# Patient Record
Sex: MALE | Race: WHITE | Employment: OTHER | URBAN - METROPOLITAN AREA
[De-identification: names, ages, dates, MRNs, and addresses within clinical notes are randomized per-mention and may not be internally consistent; named-entity substitution may affect disease eponyms.]

---

## 2017-05-10 ENCOUNTER — TELEPHONE (OUTPATIENT)
Dept: SURGERY | Facility: CLINIC | Age: 72
End: 2017-05-10

## 2017-05-10 NOTE — TELEPHONE ENCOUNTER
Pt has appt 7/7- states he is leaving the country on 7/3 until September - asking if there is sometime in June he can be seen

## 2017-05-14 ENCOUNTER — TELEPHONE (OUTPATIENT)
Dept: SURGERY | Facility: CLINIC | Age: 72
End: 2017-05-14

## 2017-05-14 DIAGNOSIS — R35.1 NOCTURIA: ICD-10-CM

## 2017-05-14 DIAGNOSIS — Z12.5 SPECIAL SCREENING FOR MALIGNANT NEOPLASM OF PROSTATE: Primary | ICD-10-CM

## 2017-05-15 NOTE — TELEPHONE ENCOUNTER
I tried to reach pt again and this time I spoke with him and informed of the appt I had to offer and I could tell that pt was not understanding what I was saying because he kept repeating the wrong date.  I told him that I would call right back with an inte

## 2017-05-15 NOTE — TELEPHONE ENCOUNTER
Nurses, please ask   to get patient in to see me either this month, or at the latest first week in June --please check last office visit note May or June 2016 and see what labs I requested and please make sure patient gets those labs compl

## 2017-05-15 NOTE — TELEPHONE ENCOUNTER
I attempted to reach pt at the only # listed and it just keeps ringing and no answ. Machine or VM goes on. I will try again. I am holding an appt slot for thurs 5/25 at 2:20pm for 20 min O/V.  I will make new orders for pt's psa screen and ua that he was rodriguez

## 2017-05-21 ENCOUNTER — TELEPHONE (OUTPATIENT)
Dept: SURGERY | Facility: CLINIC | Age: 72
End: 2017-05-21

## 2017-05-22 ENCOUNTER — APPOINTMENT (OUTPATIENT)
Dept: LAB | Facility: HOSPITAL | Age: 72
End: 2017-05-22
Attending: UROLOGY
Payer: MEDICARE

## 2017-05-22 DIAGNOSIS — R35.1 NOCTURIA: ICD-10-CM

## 2017-05-22 DIAGNOSIS — Z12.5 SPECIAL SCREENING FOR MALIGNANT NEOPLASM OF PROSTATE: ICD-10-CM

## 2017-05-22 PROCEDURE — 81001 URINALYSIS AUTO W/SCOPE: CPT

## 2017-05-22 PROCEDURE — 36415 COLL VENOUS BLD VENIPUNCTURE: CPT

## 2017-05-22 NOTE — TELEPHONE ENCOUNTER
See below. Phoned pt again and spoke with him. Informed him of 's request to bring all of his test results with him, as outlined below. He states he is picking up his tests today, and agrees to bring to his appt 5/25/17.

## 2017-05-22 NOTE — TELEPHONE ENCOUNTER
Phoned pt using language line, Ashley Bianchi , Id # [id number]. Provided her with pt's contact number. She attempted to reach pt, however phone rang for extended period of time without answer or voice mail option.

## 2017-05-22 NOTE — TELEPHONE ENCOUNTER
Please call patient, and if he does not understand [place], please use language line for [place]--  Patient has appointment to see me this week; reportedly he had some type of lab test or some type of x-ray test performed at another institution which sh

## 2017-05-25 ENCOUNTER — OFFICE VISIT (OUTPATIENT)
Dept: SURGERY | Facility: CLINIC | Age: 72
End: 2017-05-25

## 2017-05-25 VITALS — RESPIRATION RATE: 16 BRPM | TEMPERATURE: 98 F | DIASTOLIC BLOOD PRESSURE: 78 MMHG | SYSTOLIC BLOOD PRESSURE: 120 MMHG

## 2017-05-25 DIAGNOSIS — R10.9 FLANK PAIN: Primary | ICD-10-CM

## 2017-05-25 DIAGNOSIS — N40.1 BENIGN NON-NODULAR PROSTATIC HYPERPLASIA WITH LOWER URINARY TRACT SYMPTOMS: ICD-10-CM

## 2017-05-25 DIAGNOSIS — M47.817 SPONDYLOSIS OF LUMBOSACRAL REGION WITHOUT MYELOPATHY OR RADICULOPATHY: ICD-10-CM

## 2017-05-25 DIAGNOSIS — R10.31 RIGHT LOWER QUADRANT PAIN: ICD-10-CM

## 2017-05-25 DIAGNOSIS — Z12.5 PROSTATE CANCER SCREENING: ICD-10-CM

## 2017-05-25 DIAGNOSIS — R35.1 NOCTURIA: ICD-10-CM

## 2017-05-25 PROCEDURE — 99215 OFFICE O/P EST HI 40 MIN: CPT | Performed by: UROLOGY

## 2017-05-25 PROCEDURE — G0463 HOSPITAL OUTPT CLINIC VISIT: HCPCS | Performed by: UROLOGY

## 2017-05-25 NOTE — PATIENT INSTRUCTIONS
1.  With respect to your right lower quadrant discomfort radiating to the right flank, there is no evidence that it is due to a urinary tract disease process because the CT at [place] [address] on 3/29/17 shows the kidneys to be normal with no hydronephrosis.

## 2017-05-29 NOTE — PROGRESS NOTES
HPI:    Patient ID: Jennifer Dang is a 66year old male. Flank Pain   Pertinent negatives include no fever.          Voiding Dysfunction  Patient has current AUA score of 3, mild voiding dysfunction category, compared to previous score of 3.5, mild voiding of the upper urinary tracts, no hydronephrosis, no stones. 3/11/17 EGD [facility] showed mild esophagitis consistent with reflux; stomach and duodenum unremarkable; colonoscopy with biopsy unremarkable.     Review of Systems   Constitutional: Neg 0.7  5/6/14 PSA 0.4  3/23/17 [facility] labs est. GFR 79 normal;  creatinine 0.96 normal;  alkaline phosphatase 53 normal.         IMAGING:  3/29/17 [facility] CT abdomen and pelvis with contrast = kidneys unremarkable; bladder unremarkable; prominent showed no hydronephrosis, no urolithiasis; I explained to him that, hence, pain  not urological in nature.   Patient will follow up with his gastroenterologist and primary physician concerning that      (Z12.5) Prostate cancer screening  Plan: Patient is 66

## 2018-05-10 ENCOUNTER — APPOINTMENT (OUTPATIENT)
Dept: LAB | Facility: HOSPITAL | Age: 73
End: 2018-05-10
Attending: UROLOGY
Payer: MEDICARE

## 2018-05-10 DIAGNOSIS — R35.1 NOCTURIA: ICD-10-CM

## 2018-05-10 PROCEDURE — 81001 URINALYSIS AUTO W/SCOPE: CPT

## 2018-05-25 ENCOUNTER — OFFICE VISIT (OUTPATIENT)
Dept: SURGERY | Facility: CLINIC | Age: 73
End: 2018-05-25

## 2018-05-25 VITALS
HEIGHT: 68 IN | BODY MASS INDEX: 30.31 KG/M2 | HEART RATE: 68 BPM | DIASTOLIC BLOOD PRESSURE: 66 MMHG | SYSTOLIC BLOOD PRESSURE: 104 MMHG | WEIGHT: 200 LBS

## 2018-05-25 DIAGNOSIS — N40.1 BENIGN NON-NODULAR PROSTATIC HYPERPLASIA WITH LOWER URINARY TRACT SYMPTOMS: Primary | ICD-10-CM

## 2018-05-25 DIAGNOSIS — Z79.82 ASPIRIN LONG-TERM USE: ICD-10-CM

## 2018-05-25 DIAGNOSIS — R35.1 NOCTURIA: ICD-10-CM

## 2018-05-25 PROCEDURE — 99214 OFFICE O/P EST MOD 30 MIN: CPT | Performed by: UROLOGY

## 2018-05-25 PROCEDURE — G0463 HOSPITAL OUTPT CLINIC VISIT: HCPCS | Performed by: UROLOGY

## 2018-05-25 RX ORDER — OMEPRAZOLE 20 MG/1
CAPSULE, DELAYED RELEASE ORAL
COMMUNITY
Start: 2018-04-20

## 2018-05-25 RX ORDER — CLONAZEPAM 0.5 MG/1
TABLET ORAL
Refills: 2 | COMMUNITY
Start: 2018-03-29

## 2018-05-25 RX ORDER — ATORVASTATIN CALCIUM 20 MG/1
TABLET, FILM COATED ORAL
COMMUNITY
Start: 2018-03-29

## 2018-05-25 NOTE — PROGRESS NOTES
HPI:    Patient ID: Edd Jones is a 67year old male. HPI    History provided by pt and wife, [place] translation provided by me.      Voiding Dysfunction  Patient has current AUA score of 4, mild voiding dysfunction category, similar compared to pre hyperactive cremasteric reflex on the left side. 5/16/2014 the patient had borderline microhematuria 3 RBC/hpf.    Since 2014, intermittent right lower quadrant and right lower flank pain; 3/29/17 [facility] CT abdomen pelvis with contrast sh Hemorrhoids 2006    per [employer]:  hemorrhoidectomy   • Hydrocele 05/19/2014    per [facility]:     • Lateral epicondylitis  of elbow     per [facility]:  right elbow   • Osteoarthrosis, unspecified whether generalized or localized, unspecified site     per [facility] person, place, and time. He appears well-developed and well-nourished. No distress. HENT:   Head: Normocephalic and atraumatic. Eyes: EOM are normal.   Neck: Normal range of motion. Pulmonary/Chest: Effort normal. No respiratory distress.    Flower Betancourt discussed, explained, and answered questions on treatment options and patient understood and chooses to continue observation.  I explained to the pt that the heart healthy diet is the prostate healthy diet -- please see summary of discussion that took place than processed grain. A significant part of your diet should be  vegetables, nuts, and fruits. Olive oil is better than butter,  which is likely better than margarine. Fish oil supplement recommended.   An excessive amount of vitamin pills is not recommen

## 2018-05-25 NOTE — PATIENT INSTRUCTIONS
1.   Visit in one year. Urinalysis urien test before visit    2. The heart healthy diet is the prostate healthy diet. Eat healthy food choices from the [address] diet: avoid processed foods and avoid polyunsaturated fats.  Eating fish is  better th

## 2019-05-16 ENCOUNTER — APPOINTMENT (OUTPATIENT)
Dept: LAB | Facility: HOSPITAL | Age: 74
End: 2019-05-16
Attending: UROLOGY
Payer: MEDICARE

## 2019-05-16 DIAGNOSIS — R35.1 NOCTURIA: ICD-10-CM

## 2019-05-16 PROCEDURE — 81003 URINALYSIS AUTO W/O SCOPE: CPT

## 2019-05-24 ENCOUNTER — APPOINTMENT (OUTPATIENT)
Dept: LAB | Facility: HOSPITAL | Age: 74
End: 2019-05-24
Attending: UROLOGY
Payer: MEDICARE

## 2019-05-24 ENCOUNTER — OFFICE VISIT (OUTPATIENT)
Dept: SURGERY | Facility: CLINIC | Age: 74
End: 2019-05-24
Payer: MEDICARE

## 2019-05-24 VITALS
TEMPERATURE: 98 F | HEART RATE: 67 BPM | DIASTOLIC BLOOD PRESSURE: 75 MMHG | SYSTOLIC BLOOD PRESSURE: 129 MMHG | BODY MASS INDEX: 30 KG/M2 | WEIGHT: 200 LBS

## 2019-05-24 DIAGNOSIS — N40.1 BENIGN NON-NODULAR PROSTATIC HYPERPLASIA WITH LOWER URINARY TRACT SYMPTOMS: Primary | ICD-10-CM

## 2019-05-24 DIAGNOSIS — Z12.5 PROSTATE CANCER SCREENING: ICD-10-CM

## 2019-05-24 DIAGNOSIS — Z79.82 ASPIRIN LONG-TERM USE: ICD-10-CM

## 2019-05-24 DIAGNOSIS — R35.1 NOCTURIA: ICD-10-CM

## 2019-05-24 PROCEDURE — 99213 OFFICE O/P EST LOW 20 MIN: CPT | Performed by: UROLOGY

## 2019-05-24 PROCEDURE — G0463 HOSPITAL OUTPT CLINIC VISIT: HCPCS | Performed by: UROLOGY

## 2019-05-24 PROCEDURE — 81003 URINALYSIS AUTO W/O SCOPE: CPT

## 2019-05-24 PROCEDURE — 36415 COLL VENOUS BLD VENIPUNCTURE: CPT

## 2019-05-24 NOTE — PROGRESS NOTES
HPI:    Patient ID: Anel Baron is a 75year old male. HPI    History provided by patient in [place], translation provided by me.      Voiding Dysfunction  Patient has current AUA score of 2, mild voiding dysfunction category, improved compared to pre contrast showed no pathology of the upper urinary tracts, no hydronephrosis, no stones. 3/11/17 EGD [facility] showed mild esophagitis consistent with reflux; stomach and duodenum unremarkable; colonoscopy with biopsy unremarkable.   05/25/2017 o tendon near insertion, right shoulder   • Unspecified essential hypertension       Past Surgical History:   Procedure Laterality Date   • ADENOIDECTOMY        Family History   Problem Relation Age of Onset   • Prostate Cancer Other    • Genito-Urinary Diso indurations   Musculoskeletal: Normal range of motion. Neurological: He is alert and oriented to person, place, and time. Skin: Skin is dry. Psychiatric: He has a normal mood and affect. Thought content normal.   Nursing note and vitals reviewed. dysfunction category. He is not currently taking any  medication. He admits that if he drank less before bed his nocturia would likely decrease. I recommend patient decrease his fluid intake 3 hours before bedtime if his nocturia bothers him.  Patient fee processed foods and avoid polyunsaturated fats. Eating fish is  better than poultry,  which is better than red meat. Beans are a very healthy option. Whole grain is better than processed grain.  A significant part of your diet should be  vegetables, nuts, a

## 2019-05-24 NOTE — PATIENT INSTRUCTIONS
Murrel Apgar, M.D.      1.  Blood draw for PSA--prostate cancer screening today; we will notify you of the results whether normal or abnormal.    2.   Visit in one year. Urinalysis urine test before visit    3.   If you wou

## 2020-05-28 ENCOUNTER — TELEPHONE (OUTPATIENT)
Dept: SURGERY | Facility: CLINIC | Age: 75
End: 2020-05-28

## 2020-05-28 NOTE — TELEPHONE ENCOUNTER
Do to COVID-19 (Coronavirus) global pandemic patient cancelled visit scheduled for tomorrow, states will call back to reschedule.

## 2020-12-01 ENCOUNTER — OFFICE VISIT (OUTPATIENT)
Dept: SURGERY | Facility: CLINIC | Age: 75
End: 2020-12-01
Payer: MEDICARE

## 2020-12-01 VITALS
DIASTOLIC BLOOD PRESSURE: 88 MMHG | SYSTOLIC BLOOD PRESSURE: 155 MMHG | RESPIRATION RATE: 16 BRPM | BODY MASS INDEX: 30.31 KG/M2 | WEIGHT: 200 LBS | HEART RATE: 66 BPM | HEIGHT: 68 IN

## 2020-12-01 DIAGNOSIS — N40.1 BENIGN PROSTATIC HYPERPLASIA WITH URINARY FREQUENCY: Primary | ICD-10-CM

## 2020-12-01 DIAGNOSIS — Z12.5 PROSTATE CANCER SCREENING: ICD-10-CM

## 2020-12-01 DIAGNOSIS — Z79.82 ASPIRIN LONG-TERM USE: ICD-10-CM

## 2020-12-01 DIAGNOSIS — R35.0 BENIGN PROSTATIC HYPERPLASIA WITH URINARY FREQUENCY: Primary | ICD-10-CM

## 2020-12-01 DIAGNOSIS — R10.9 RIGHT FLANK PAIN: ICD-10-CM

## 2020-12-01 DIAGNOSIS — R35.1 NOCTURIA: ICD-10-CM

## 2020-12-01 PROCEDURE — G0463 HOSPITAL OUTPT CLINIC VISIT: HCPCS | Performed by: UROLOGY

## 2020-12-01 PROCEDURE — 99214 OFFICE O/P EST MOD 30 MIN: CPT | Performed by: UROLOGY

## 2020-12-01 RX ORDER — MULTIVIT-MIN/IRON/FOLIC ACID/K 18-600-40
CAPSULE ORAL
COMMUNITY

## 2020-12-01 NOTE — PROGRESS NOTES
HPI:    Patient ID: Pricila Wright is a 67year old male. HPI     History provided by patient and wife. Translated in [place] by Dr. Alexandro Aden. BPH   Chronic. Patient is not currently taking any prostate medications for this.  He presently denies any assoc 10/23/12; prostate moderately enlarged without nodules; April 3, 2012 at [facility] he had had a PSA of 0.4. No evidence that he had prostate cancer. On physical exam he did have a hyperactive cremasteric reflex on the left side.  5/16/2014 the pat Allergies    HISTORY:  Past Medical History:   Diagnosis Date   • Hemorrhoids 2006    per [employer]:  hemorrhoidectomy   • Hydrocele 05/19/2014    per [employer]:     • Lateral epicondylitis  of elbow     per [employer]:  right elbow   • Osteoarthrosis, unspecifie kg/m².        LABORATORIES  07/17/2020 ([facility]) PSA = 0.4; Creatinine = 0.96; GFR = 77   05/24/2019 PSA = 0.56; UA blood = negative; Microscopic not indicated   05/16/2019 UA blood = negative; Microscopic not indicated  05/10/2018 UA RBC = 2; WBC = <1  05/2 current AUA score of 5, mild voiding dysfunction category; nocturia 3x. He is not currently taking any medication for this. We discussed starting tamsulosin 0.4 mg daily vs observation.  I fully explained to patient the benefits, risks, and alternatives to PSA before visit.            I explained to patient the risks, side effects, and alternatives, and I answered questions concerning them; patient understands all of this and decides to proceed with the following:      Treatment Plan & Patient Instructions

## 2020-12-01 NOTE — PATIENT INSTRUCTIONS
Tino Roberson M.D.    1.  Urinalysis urine test today--we will notify you of the results    2. KIDNEY/RENAL ULTRASOUND--please schedule by calling [phone].   Please request that it be performed either at [facility]

## 2020-12-09 ENCOUNTER — HOSPITAL ENCOUNTER (OUTPATIENT)
Dept: ULTRASOUND IMAGING | Facility: HOSPITAL | Age: 75
Discharge: HOME | End: 2020-12-09
Attending: UROLOGY
Payer: MEDICARE

## 2020-12-09 DIAGNOSIS — R10.9 RIGHT FLANK PAIN: ICD-10-CM

## 2020-12-09 PROCEDURE — 76775 US EXAM ABDO BACK WALL LIM: CPT | Performed by: UROLOGY

## 2020-12-11 DIAGNOSIS — R93.429 ABNORMAL ULTRASOUND OF KIDNEY: Primary | ICD-10-CM

## 2020-12-11 DIAGNOSIS — N28.9 KIDNEY LESION, NATIVE, BILATERAL: ICD-10-CM

## 2020-12-23 ENCOUNTER — TELEPHONE (OUTPATIENT)
Dept: SURGERY | Facility: CLINIC | Age: 75
End: 2020-12-23

## 2020-12-23 NOTE — TELEPHONE ENCOUNTER
Called patient to notify that 12/9/2020 kidney ultrasound shows no blockage of either kidney, no kidney stones; LEFT kidney has a 1.5 cm = 2/3 of an inch complex cyst and that the RIGHT kidney has a tiny 3 mm = 1/8 of an inch tiny lesion; both of these are

## 2021-02-18 ENCOUNTER — HOSPITAL ENCOUNTER (OUTPATIENT)
Dept: CT IMAGING | Facility: HOSPITAL | Age: 76
Discharge: HOME | End: 2021-02-18
Attending: UROLOGY
Payer: MEDICARE

## 2021-02-18 DIAGNOSIS — N28.9 KIDNEY LESION, NATIVE, BILATERAL: ICD-10-CM

## 2021-02-18 DIAGNOSIS — R93.429 ABNORMAL ULTRASOUND OF KIDNEY: ICD-10-CM

## 2021-02-18 LAB — CREAT BLD-MCNC: 0.9 MG/DL

## 2021-02-18 PROCEDURE — 82565 ASSAY OF CREATININE: CPT

## 2021-02-18 PROCEDURE — 74170 CT ABD WO CNTRST FLWD CNTRST: CPT | Performed by: UROLOGY

## 2021-02-19 ENCOUNTER — TELEPHONE (OUTPATIENT)
Dept: SURGERY | Facility: CLINIC | Age: 76
End: 2021-02-19

## 2021-02-19 NOTE — TELEPHONE ENCOUNTER
----- Message from Rosey Redmond MD sent at 2/18/2021  3:51 PM CST -----  Urology nurses, please call patient because he does not check \"my chart\"  +  also please send him a copy of the 2/18/2021 CT report   Please notify patient of the following and ask him whether or not to use language line [place]) =  3/18/2021 CT of the abdomen with and without IV contrast shows a BENIGN small 1.3 cm left kidney cyst that does NOT  require any treatment; it will not cause any problems. There is only mild arthritis of the spine, much, much better than most 67year-old. The CT shows mild liver steatosis--mildly increased size of the liver from weight gain; it does not cause any cancer. There is moderate/medium amount of calcium cholesterol plaque in the arteries so that is something to discuss with his primary physician or his cardiologist.   Please continue with urology plans as last discussed --  \"3. Visit in September 2021. Please do urinalysis urine test blood draw for PSA prostate cancer screening 1--10 days before visit. Take to the PSA blood test, you do not have to fast for this test but you may eat breakfast and lunch that day. \"  Thank you, Dr. Isaiah Black

## 2021-02-19 NOTE — TELEPHONE ENCOUNTER
I called pt with the assistance of [place] interpretor Poonam Coley # [id number] and I informed him of all of the results and instructions in ARTHUR's results and instructions in his msg below and he verbalized understanding and compliance.

## 2021-08-25 ENCOUNTER — LAB ENCOUNTER (OUTPATIENT)
Dept: LAB | Facility: HOSPITAL | Age: 76
End: 2021-08-25
Attending: UROLOGY
Payer: MEDICARE

## 2021-08-25 DIAGNOSIS — R35.1 NOCTURIA: ICD-10-CM

## 2021-08-25 DIAGNOSIS — Z12.5 PROSTATE CANCER SCREENING: ICD-10-CM

## 2021-08-25 LAB
BILIRUB UR QL: NEGATIVE
CLARITY UR: CLEAR
COLOR UR: YELLOW
COMPLEXED PSA SERPL-MCNC: 0.54 NG/ML (ref ?–4)
GLUCOSE UR-MCNC: NEGATIVE MG/DL
KETONES UR-MCNC: NEGATIVE MG/DL
LEUKOCYTE ESTERASE UR QL STRIP.AUTO: NEGATIVE
NITRITE UR QL STRIP.AUTO: NEGATIVE
PH UR: 5 [PH] (ref 5–8)
PROT UR-MCNC: NEGATIVE MG/DL
SP GR UR STRIP: 1.03 (ref 1–1.03)
UROBILINOGEN UR STRIP-ACNC: <2

## 2021-08-25 PROCEDURE — 81001 URINALYSIS AUTO W/SCOPE: CPT

## 2021-08-25 PROCEDURE — 36415 COLL VENOUS BLD VENIPUNCTURE: CPT

## 2021-08-26 ENCOUNTER — OFFICE VISIT (OUTPATIENT)
Dept: SURGERY | Facility: CLINIC | Age: 76
End: 2021-08-26
Payer: MEDICARE

## 2021-08-26 VITALS
HEIGHT: 68 IN | HEART RATE: 58 BPM | WEIGHT: 200 LBS | SYSTOLIC BLOOD PRESSURE: 129 MMHG | BODY MASS INDEX: 30.31 KG/M2 | DIASTOLIC BLOOD PRESSURE: 75 MMHG

## 2021-08-26 DIAGNOSIS — Z12.5 PROSTATE CANCER SCREENING: ICD-10-CM

## 2021-08-26 DIAGNOSIS — N28.1 COMPLEX RENAL CYST: ICD-10-CM

## 2021-08-26 DIAGNOSIS — N39.0 RECURRENT UTI: ICD-10-CM

## 2021-08-26 DIAGNOSIS — R35.1 NOCTURIA: Primary | ICD-10-CM

## 2021-08-26 DIAGNOSIS — N40.1 BENIGN PROSTATIC HYPERPLASIA WITH URINARY FREQUENCY: ICD-10-CM

## 2021-08-26 DIAGNOSIS — R35.0 BENIGN PROSTATIC HYPERPLASIA WITH URINARY FREQUENCY: ICD-10-CM

## 2021-08-26 PROCEDURE — 99215 OFFICE O/P EST HI 40 MIN: CPT | Performed by: UROLOGY

## 2021-08-26 RX ORDER — CIPROFLOXACIN 500 MG/1
500 TABLET, FILM COATED ORAL 2 TIMES DAILY
Qty: 20 TABLET | Refills: 0 | Status: SHIPPED | OUTPATIENT
Start: 2021-08-26 | End: 2021-08-26 | Stop reason: ALTCHOICE

## 2021-08-26 RX ORDER — CIPROFLOXACIN 500 MG/1
500 TABLET, FILM COATED ORAL 2 TIMES DAILY
Qty: 20 TABLET | Refills: 0 | Status: SHIPPED | OUTPATIENT
Start: 2021-08-26 | End: 2021-09-05

## 2021-08-26 NOTE — PATIENT INSTRUCTIONS
Jodie Biswas M.D.     1. To maintain higher HDL levels and better \"good cholesterol\" levels, please consider eating nuts, fish twice a week    2.   Because of your concern of developing urinary tract inf

## 2021-08-26 NOTE — PROGRESS NOTES
HPI:    Patient ID: Alex Crenshaw is a 67year old male. HPI     History provided by patient and wife. BPH   Chronic. Patient is not currently taking any prostate medications for this. He presently denies any associated pelvic pain or discomfort.  He abdomen with and without contrast revealed a Bosniak 2 cyst which radiologist called benign. Patient has had no flank pain after that, no gross hematuria.         Aspirin therapy   Patient is currently taking aspirin 81 mg daily. He feels this is stable. which radiologist officially called benign      Review of Systems   Constitutional: Negative for fever. HENT: Negative for voice change. Respiratory: Negative for chest tightness and shortness of breath. Cardiovascular: Negative for chest pain.    G History: Social History    Tobacco Use      Smoking status: Never Smoker      Smokeless tobacco: Never Used    Alcohol use: Yes      Comment: beer & liquor, small amounts, socially    Drug use: Not on file                 PHYSICAL EXAM:    Physical Exam  N Cardiomegaly and coronary atherosclerosis    12/9/2020 ultrasound kidneys  Mildly complex cortical cyst inferior pole of the left kidney has increased in size. \"Recommend further assessment with pre-/post contrast CT of the abdomen\".     03/29/2017 CT ab tamsulosin 0.4 mg daily vs observation. I fully explained to patient the benefits, risks, and alternatives to this treatment option and I answered patient's questions; patient decides to against starting medication and chooses to continue observation.  I re recent/25/21 PSA 0.54 very low normal.  Stable. .  I explain to patient the guidelines of the American urological Association, which do not recommend routine, annual PSA screening in men after the age of 71.   I also explain to patient that the probability t Signed Prescriptions Disp Refills   • ciprofloxacin 500 MG Oral Tab 20 tablet 0     Sig: Take 1 tablet (500 mg total) by mouth 2 (two) times daily for 10 days.        Imaging & Referrals:  None     Jesus Briones M.D.

## 2023-06-04 NOTE — TELEPHONE ENCOUNTER
Spoke with pt and asked him to move his appt down to 2:40pm on thurs 5/25 from 2:20pm. He agreed to this. I changed this in the computer. Calm

## (undated) NOTE — MR AVS SNAPSHOT
[place]  [address]  [phone]               Thank you for choosing us for your health care visit with Lily Huddleston MD.  We are glad to serve you and happy to provide you with this summ age of 66 with such a perfect PSA is 0.5, further routine annual PSA testing is not necessary    5. Visit in  one year.    Urinalysis urine test a few days or several days before visit       Allergies as of May 25, 2017     No Known Allergies Weight Reduction Maintain normal body weight (body mass index 18.5-24.9 kg/m2)   DASH eating plan Adopt a diet rich in fruits, vegetables, and low fat dairy products with reduced content of saturated and total fat.    Dietary sodium reduction Reduce dietary track your progress   You don’t need to join a gym. Home exercises work great.  Put more priority on exercise in your life                    Visit [facility] online at  [email or web address]

## (undated) NOTE — LETTER
12/4/2020              Jennfier Dang        [address]        [facility] [address]         Dear [facility],      It was a pleasure to see you at our [address] [place] [address] office.   Your urinalysis is normal. Continue with pl